# Patient Record
Sex: FEMALE | Race: BLACK OR AFRICAN AMERICAN | NOT HISPANIC OR LATINO | Employment: STUDENT | URBAN - METROPOLITAN AREA
[De-identification: names, ages, dates, MRNs, and addresses within clinical notes are randomized per-mention and may not be internally consistent; named-entity substitution may affect disease eponyms.]

---
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
## 2025-04-10 ENCOUNTER — HOSPITAL ENCOUNTER (EMERGENCY)
Facility: HOSPITAL | Age: 20
Discharge: HOME | End: 2025-04-10
Attending: EMERGENCY MEDICINE
Payer: COMMERCIAL

## 2025-04-10 VITALS
SYSTOLIC BLOOD PRESSURE: 113 MMHG | BODY MASS INDEX: 20.61 KG/M2 | WEIGHT: 112 LBS | HEIGHT: 62 IN | OXYGEN SATURATION: 99 % | TEMPERATURE: 99.3 F | RESPIRATION RATE: 16 BRPM | HEART RATE: 119 BPM | DIASTOLIC BLOOD PRESSURE: 95 MMHG

## 2025-04-10 DIAGNOSIS — F12.90 CANNABINOID HYPEREMESIS SYNDROME: ICD-10-CM

## 2025-04-10 DIAGNOSIS — E87.6 HYPOKALEMIA: ICD-10-CM

## 2025-04-10 DIAGNOSIS — R11.2 CANNABINOID HYPEREMESIS SYNDROME: ICD-10-CM

## 2025-04-10 DIAGNOSIS — R11.2 NAUSEA AND VOMITING, UNSPECIFIED VOMITING TYPE: Primary | ICD-10-CM

## 2025-04-10 LAB
ALBUMIN SERPL-MCNC: 5 G/DL (ref 3.5–5.2)
ALBUMIN/GLOB SERPL: 1.3 G/DL
ALP SERPL-CCNC: 87 U/L (ref 39–117)
ALT SERPL W P-5'-P-CCNC: 28 U/L (ref 1–33)
ANION GAP SERPL CALCULATED.3IONS-SCNC: 21 MMOL/L (ref 5–15)
AST SERPL-CCNC: 46 U/L (ref 1–32)
BASOPHILS # BLD MANUAL: 0 10*3/MM3 (ref 0–0.2)
BASOPHILS NFR BLD MANUAL: 0 % (ref 0–1.5)
BILIRUB SERPL-MCNC: 1.1 MG/DL (ref 0–1.2)
BUN SERPL-MCNC: 11 MG/DL (ref 6–20)
BUN/CREAT SERPL: 16.7 (ref 7–25)
CALCIUM SPEC-SCNC: 9.9 MG/DL (ref 8.6–10.5)
CHLORIDE SERPL-SCNC: 84 MMOL/L (ref 98–107)
CO2 SERPL-SCNC: 27 MMOL/L (ref 22–29)
CREAT SERPL-MCNC: 0.66 MG/DL (ref 0.57–1)
DEPRECATED RDW RBC AUTO: 33.8 FL (ref 37–54)
EGFRCR SERPLBLD CKD-EPI 2021: 129 ML/MIN/1.73
EOSINOPHIL # BLD MANUAL: 0 10*3/MM3 (ref 0–0.4)
EOSINOPHIL NFR BLD MANUAL: 0 % (ref 0.3–6.2)
ERYTHROCYTE [DISTWIDTH] IN BLOOD BY AUTOMATED COUNT: 12 % (ref 12.3–15.4)
GLOBULIN UR ELPH-MCNC: 3.8 GM/DL
GLUCOSE SERPL-MCNC: 129 MG/DL (ref 65–99)
HCT VFR BLD AUTO: 46.1 % (ref 34–46.6)
HGB BLD-MCNC: 16.4 G/DL (ref 12–15.9)
HOLD SPECIMEN: NORMAL
LIPASE SERPL-CCNC: 12 U/L (ref 13–60)
LYMPHOCYTES # BLD MANUAL: 1.86 10*3/MM3 (ref 0.7–3.1)
LYMPHOCYTES NFR BLD MANUAL: 10 % (ref 5–12)
MAGNESIUM SERPL-MCNC: 2.3 MG/DL (ref 1.7–2.2)
MCH RBC QN AUTO: 27.9 PG (ref 26.6–33)
MCHC RBC AUTO-ENTMCNC: 35.6 G/DL (ref 31.5–35.7)
MCV RBC AUTO: 78.4 FL (ref 79–97)
MONOCYTES # BLD: 0.93 10*3/MM3 (ref 0.1–0.9)
NEUTROPHILS # BLD AUTO: 6.52 10*3/MM3 (ref 1.7–7)
NEUTROPHILS NFR BLD MANUAL: 70 % (ref 42.7–76)
PLATELET # BLD AUTO: 356 10*3/MM3 (ref 140–450)
PMV BLD AUTO: 9.5 FL (ref 6–12)
POTASSIUM SERPL-SCNC: 3.3 MMOL/L (ref 3.5–5.2)
PROT SERPL-MCNC: 8.8 G/DL (ref 6–8.5)
RBC # BLD AUTO: 5.88 10*6/MM3 (ref 3.77–5.28)
RBC MORPH BLD: NORMAL
SMALL PLATELETS BLD QL SMEAR: ADEQUATE
SODIUM SERPL-SCNC: 132 MMOL/L (ref 136–145)
VARIANT LYMPHS NFR BLD MANUAL: 13 % (ref 19.6–45.3)
VARIANT LYMPHS NFR BLD MANUAL: 7 % (ref 0–5)
WBC MORPH BLD: NORMAL
WBC NRBC COR # BLD AUTO: 9.31 10*3/MM3 (ref 3.4–10.8)
WHOLE BLOOD HOLD COAG: NORMAL
WHOLE BLOOD HOLD SPECIMEN: NORMAL

## 2025-04-10 PROCEDURE — 85025 COMPLETE CBC W/AUTO DIFF WBC: CPT | Performed by: PHYSICIAN ASSISTANT

## 2025-04-10 PROCEDURE — 99283 EMERGENCY DEPT VISIT LOW MDM: CPT

## 2025-04-10 PROCEDURE — 80053 COMPREHEN METABOLIC PANEL: CPT | Performed by: PHYSICIAN ASSISTANT

## 2025-04-10 PROCEDURE — 83735 ASSAY OF MAGNESIUM: CPT | Performed by: PHYSICIAN ASSISTANT

## 2025-04-10 PROCEDURE — 96374 THER/PROPH/DIAG INJ IV PUSH: CPT

## 2025-04-10 PROCEDURE — 25010000002 MIDAZOLAM PER 1 MG: Performed by: EMERGENCY MEDICINE

## 2025-04-10 PROCEDURE — 83690 ASSAY OF LIPASE: CPT | Performed by: PHYSICIAN ASSISTANT

## 2025-04-10 PROCEDURE — 25010000002 DIPHENHYDRAMINE PER 50 MG: Performed by: PHYSICIAN ASSISTANT

## 2025-04-10 PROCEDURE — 25010000002 METOCLOPRAMIDE PER 10 MG: Performed by: PHYSICIAN ASSISTANT

## 2025-04-10 PROCEDURE — 96375 TX/PRO/DX INJ NEW DRUG ADDON: CPT

## 2025-04-10 PROCEDURE — 25810000003 SODIUM CHLORIDE 0.9 % SOLUTION: Performed by: PHYSICIAN ASSISTANT

## 2025-04-10 PROCEDURE — 85007 BL SMEAR W/DIFF WBC COUNT: CPT | Performed by: PHYSICIAN ASSISTANT

## 2025-04-10 PROCEDURE — 96361 HYDRATE IV INFUSION ADD-ON: CPT

## 2025-04-10 RX ORDER — METOCLOPRAMIDE HYDROCHLORIDE 5 MG/ML
10 INJECTION INTRAMUSCULAR; INTRAVENOUS ONCE
Status: COMPLETED | OUTPATIENT
Start: 2025-04-10 | End: 2025-04-10

## 2025-04-10 RX ORDER — DICYCLOMINE HYDROCHLORIDE 10 MG/1
10 CAPSULE ORAL 3 TIMES DAILY PRN
Qty: 12 CAPSULE | Refills: 0 | Status: SHIPPED | OUTPATIENT
Start: 2025-04-10

## 2025-04-10 RX ORDER — POTASSIUM CHLORIDE 750 MG/1
20 CAPSULE, EXTENDED RELEASE ORAL ONCE
Status: DISCONTINUED | OUTPATIENT
Start: 2025-04-10 | End: 2025-04-10 | Stop reason: HOSPADM

## 2025-04-10 RX ORDER — MIDAZOLAM HYDROCHLORIDE 1 MG/ML
0.5 INJECTION, SOLUTION INTRAMUSCULAR; INTRAVENOUS ONCE
Status: COMPLETED | OUTPATIENT
Start: 2025-04-10 | End: 2025-04-10

## 2025-04-10 RX ORDER — SODIUM CHLORIDE 0.9 % (FLUSH) 0.9 %
10 SYRINGE (ML) INJECTION AS NEEDED
Status: DISCONTINUED | OUTPATIENT
Start: 2025-04-10 | End: 2025-04-10 | Stop reason: HOSPADM

## 2025-04-10 RX ORDER — DIPHENHYDRAMINE HYDROCHLORIDE 50 MG/ML
12.5 INJECTION, SOLUTION INTRAMUSCULAR; INTRAVENOUS ONCE
Status: COMPLETED | OUTPATIENT
Start: 2025-04-10 | End: 2025-04-10

## 2025-04-10 RX ORDER — METOCLOPRAMIDE 5 MG/1
5 TABLET ORAL 3 TIMES DAILY PRN
Qty: 12 TABLET | Refills: 0 | Status: SHIPPED | OUTPATIENT
Start: 2025-04-10

## 2025-04-10 RX ORDER — POTASSIUM CHLORIDE 750 MG/1
10 TABLET, EXTENDED RELEASE ORAL 2 TIMES DAILY
Qty: 6 TABLET | Refills: 0 | Status: SHIPPED | OUTPATIENT
Start: 2025-04-10 | End: 2025-04-13

## 2025-04-10 RX ADMIN — MIDAZOLAM HYDROCHLORIDE 0.5 MG: 1 INJECTION, SOLUTION INTRAMUSCULAR; INTRAVENOUS at 18:28

## 2025-04-10 RX ADMIN — METOCLOPRAMIDE 10 MG: 5 INJECTION, SOLUTION INTRAMUSCULAR; INTRAVENOUS at 17:41

## 2025-04-10 RX ADMIN — DIPHENHYDRAMINE HYDROCHLORIDE 12.5 MG: 50 INJECTION INTRAMUSCULAR; INTRAVENOUS at 17:42

## 2025-04-10 RX ADMIN — SODIUM CHLORIDE 1000 ML: 9 INJECTION, SOLUTION INTRAVENOUS at 17:41

## 2025-04-10 NOTE — ED PROVIDER NOTES
Subjective  History of Present Illness:    Chief Complaint: Nausea and vomiting  History of Present Illness: 28-year-old female presents with a complaint of nausea and vomiting that has been occurring for the past 3 days.  Boyfriend is in the room as well to help provide history as she appears very ill.  The boyfriend states that she has been vomiting significantly and cannot keep down  food or liquids and appears that she has lost about 15 pounds over the past 3 days.  She does have a history of IBS, but has never had a flare like this before and denies constipation or diarrhea.  She states that she is also having abdominal pain but cannot localize.  She does not drink alcohol but she does smoke weed, and states that this has not happened to her before in the past when she has smoked weed.  Onset: Gradual  Duration: Nausea and vomiting over the past 3 days  Exacerbating / Alleviating factors: Food/liquids exacerbate nausea and vomiting.  Associated symptoms: Abdominal pain, weakness      Nurses Notes reviewed and agree, including vitals, allergies, social history and prior medical history.     REVIEW OF SYSTEMS: All systems reviewed and not pertinent unless noted.    Review of Systems   Constitutional:  Positive for appetite change, chills, fatigue and fever.   Gastrointestinal:  Positive for abdominal pain, nausea and vomiting.   Neurological:  Positive for dizziness.   All other systems reviewed and are negative.      Past Medical History:   Diagnosis Date    Urinary tract infection        Allergies:    Aloe, Latex, Phenergan [promethazine], Sulfa antibiotics, Ondansetron, and Penicillins      No past surgical history on file.      Social History     Socioeconomic History    Marital status: Single   Tobacco Use    Smoking status: Never    Smokeless tobacco: Never   Vaping Use    Vaping status: Never Used   Substance and Sexual Activity    Alcohol use: Not Currently    Drug use: Never         No family history on  "file.    Objective  Physical Exam:  /95 (BP Location: Left arm, Patient Position: Sitting)   Pulse 119   Temp 99.3 °F (37.4 °C) (Oral)   Resp 16   Ht 157.5 cm (62\")   Wt 50.8 kg (112 lb)   SpO2 99%   Breastfeeding No   BMI 20.49 kg/m²      Physical Exam  Vitals and nursing note reviewed.   Constitutional:       Appearance: She is ill-appearing.           Procedures    ED Course:    ED Course as of 04/10/25 1922   Thu Apr 10, 2025   1841 Labs show a lot of hemoconcentration, I do long discussion with patient at the bedside, she is a heavy cannabis user, and I suspect that her vomiting is related to cannabis use.  Her signs and symptoms appear consistent with cannabis hyperemesis. [CS]   1841 Hemoglobin(!): 16.4 [CS]   1904 Patient is very anxious, pacing around the room, cannot sit still, she is rocking in the fetal position, squatting, pacing in and out of the room, very erratic behavior. [CS]      ED Course User Index  [CS] Lloyd Goetz Jr., LYNNE       Lab Results (last 24 hours)       Procedure Component Value Units Date/Time    CBC & Differential [[id number]]  (Abnormal) Collected: 04/10/25 1730    Specimen: Blood Updated: 04/10/25 1824    Narrative:      The following orders were created for panel order CBC & Differential.  Procedure                               Abnormality         Status                     ---------                               -----------         ------                     CBC Auto Differential[[id number]]        Abnormal            Final result               Scan Slide[[id number]]                                                                    Please view results for these tests on the individual orders.    Comprehensive Metabolic Panel [[id number]]  (Abnormal) Collected: 04/10/25 1730    Specimen: Blood Updated: 04/10/25 1804     Glucose 129 mg/dL      BUN 11 mg/dL      Creatinine 0.66 mg/dL      Sodium 132 mmol/L      Potassium 3.3 mmol/L      Comment: Slight hemolysis " detected by analyzer. Result may be falsely elevated.        Chloride 84 mmol/L      CO2 27.0 mmol/L      Calcium 9.9 mg/dL      Total Protein 8.8 g/dL      Albumin 5.0 g/dL      ALT (SGPT) 28 U/L      AST (SGOT) 46 U/L      Alkaline Phosphatase 87 U/L      Total Bilirubin 1.1 mg/dL      Globulin 3.8 gm/dL      Comment: Calculated Result        A/G Ratio 1.3 g/dL      BUN/Creatinine Ratio 16.7     Anion Gap 21.0 mmol/L      eGFR 129.0 mL/min/1.73     Narrative:      GFR Categories in Chronic Kidney Disease (CKD)      GFR Category          GFR (mL/min/1.73)    Interpretation  G1                     90 or greater         Normal or high (1)  G2                      60-89                Mild decrease (1)  G3a                   45-59                Mild to moderate decrease  G3b                   30-44                Moderate to severe decrease  G4                    15-29                Severe decrease  G5                    14 or less           Kidney failure          (1)In the absence of evidence of kidney disease, neither GFR category G1 or G2 fulfill the criteria for CKD.    eGFR calculation 2021 CKD-EPI creatinine equation, which does not include race as a factor    Lipase [[id number]]  (Abnormal) Collected: 04/10/25 1730    Specimen: Blood Updated: 04/10/25 1803     Lipase 12 U/L     CBC Auto Differential [[id number]]  (Abnormal) Collected: 04/10/25 1730    Specimen: Blood Updated: 04/10/25 1824     WBC 9.31 10*3/mm3      RBC 5.88 10*6/mm3      Hemoglobin 16.4 g/dL      Hematocrit 46.1 %      MCV 78.4 fL      MCH 27.9 pg      MCHC 35.6 g/dL      RDW 12.0 %      RDW-SD 33.8 fl      MPV 9.5 fL      Platelets 356 10*3/mm3     Narrative:      The previously reported component NRBC is no longer being reported. Previous result was 0.0 /100 WBC (Reference Range: 0.0-0.2 /100 WBC) on 4/10/2025 at 1748 EDT.    Magnesium [[id number]]  (Abnormal) Collected: 04/10/25 1730    Specimen: Blood Updated: 04/10/25 1840     Magnesium  2.3 mg/dL     Manual Differential [[id number]]  (Abnormal) Collected: 04/10/25 1730    Specimen: Blood Updated: 04/10/25 1824     Neutrophil % 70.0 %      Lymphocyte % 13.0 %      Monocyte % 10.0 %      Eosinophil % 0.0 %      Basophil % 0.0 %      Atypical Lymphocyte % 7.0 %      Neutrophils Absolute 6.52 10*3/mm3      Lymphocytes Absolute 1.86 10*3/mm3      Monocytes Absolute 0.93 10*3/mm3      Eosinophils Absolute 0.00 10*3/mm3      Basophils Absolute 0.00 10*3/mm3      RBC Morphology Normal     WBC Morphology Normal     Platelet Estimate Adequate             No radiology results from the last 24 hrs                                                                  Medical Decision Making  Problems Addressed:  Cannabinoid hyperemesis syndrome: complicated acute illness or injury  Hypokalemia: complicated acute illness or injury  Nausea and vomiting, unspecified vomiting type: complicated acute illness or injury    Amount and/or Complexity of Data Reviewed  Independent Historian: spouse and friend  External Data Reviewed: notes.  Labs: ordered. Decision-making details documented in ED Course.    Risk  Prescription drug management.  Risk Details: Patient presented with nausea and vomiting, admitted heavy cannabis use, also reports history of IBS, the patient was very uneasy, anxious, fidgety and at times erotic, was concerned about cannabis overuse, she did receive antiemetics as well as antianxiety lytics.  She continued to have very high anxiety like response and symptoms, she received fluids as well, labs did not show any acute emergent findings, had a long discussion with the patient and friend about cannabis use especially related to hyperemesis cannabis, the patient was discharged home with antiemetics, as well as several doses of potassium she has slightly low potassium, they were given signs and symptoms look for return          Final diagnoses:   Nausea and vomiting, unspecified vomiting type   Cannabinoid  hyperemesis syndrome   Hypokalemia           Disposition DISCHARGE    Patient discharged in stable condition.    Reviewed implications of results, diagnosis, meds, responsibility to follow up, warning signs and symptoms of possible worsening, potential complications and reasons to return to ER.    Patient/Family voiced understanding of above instructions.    Discussed plan for discharge, as there is no emergent indication for admission.  Pt/family is agreeable and understands need for follow up and possible repeat testing.  Pt/family is aware that discharge does not mean that nothing is wrong but that it indicates no emergency is currently present that requires admission and they must continue care with follow-up as given below or with a physician of their choice.     FOLLOW-UP  [facility] EMERGENCY DEPARTMENT  [address] [id number] [phone]    If symptoms worsen    PATIENT CONNECTION - [place] [address]  [phone]  Schedule an appointment as soon as possible for a visit            Medication List        New Prescriptions      dicyclomine 10 MG capsule  Commonly known as: BENTYL  Take 1 capsule by mouth 3 (Three) Times a Day As Needed for Abdominal Cramping.     metoclopramide 5 MG tablet  Commonly known as: REGLAN  Take 1 tablet by mouth 3 (Three) Times a Day As Needed (nausea vomting).     potassium chloride 10 MEQ CR tablet  Take 1 tablet by mouth 2 (Two) Times a Day for 3 days.               Where to Get Your Medications        These medications were sent to [id number] - [place], [place] - [address] [place] [address] - [phone]  - [phone]   [address] [address]      Phone: [phone]   dicyclomine 10 MG capsule  metoclopramide 5 MG tablet  potassium chloride 10 MEQ CR tablet             Lloyd Goetz Jr., PA-C  04/10/25 1922